# Patient Record
Sex: MALE | Race: BLACK OR AFRICAN AMERICAN | NOT HISPANIC OR LATINO | Employment: OTHER | URBAN - METROPOLITAN AREA
[De-identification: names, ages, dates, MRNs, and addresses within clinical notes are randomized per-mention and may not be internally consistent; named-entity substitution may affect disease eponyms.]

---

## 2023-03-14 ENCOUNTER — APPOINTMENT (OUTPATIENT)
Dept: PRIMARY CARE | Facility: CLINIC | Age: 53
End: 2023-03-14
Payer: COMMERCIAL

## 2023-03-20 ENCOUNTER — OFFICE VISIT (OUTPATIENT)
Dept: PRIMARY CARE | Facility: CLINIC | Age: 53
End: 2023-03-20
Payer: COMMERCIAL

## 2023-03-20 ENCOUNTER — LAB (OUTPATIENT)
Dept: LAB | Facility: LAB | Age: 53
End: 2023-03-20
Payer: COMMERCIAL

## 2023-03-20 VITALS
HEIGHT: 70 IN | DIASTOLIC BLOOD PRESSURE: 88 MMHG | BODY MASS INDEX: 23.62 KG/M2 | HEART RATE: 79 BPM | TEMPERATURE: 97.8 F | WEIGHT: 165 LBS | SYSTOLIC BLOOD PRESSURE: 144 MMHG | OXYGEN SATURATION: 99 %

## 2023-03-20 DIAGNOSIS — I10 PRIMARY HYPERTENSION: ICD-10-CM

## 2023-03-20 DIAGNOSIS — E11.42 TYPE 2 DIABETES MELLITUS WITH DIABETIC POLYNEUROPATHY, WITHOUT LONG-TERM CURRENT USE OF INSULIN (MULTI): ICD-10-CM

## 2023-03-20 DIAGNOSIS — R10.12 LUQ ABDOMINAL PAIN: Primary | ICD-10-CM

## 2023-03-20 DIAGNOSIS — M79.661 RIGHT CALF PAIN: ICD-10-CM

## 2023-03-20 DIAGNOSIS — R35.0 URINARY FREQUENCY: ICD-10-CM

## 2023-03-20 DIAGNOSIS — F40.240 CLAUSTROPHOBIA: ICD-10-CM

## 2023-03-20 DIAGNOSIS — K21.9 GASTROESOPHAGEAL REFLUX DISEASE WITHOUT ESOPHAGITIS: ICD-10-CM

## 2023-03-20 DIAGNOSIS — I25.10 CORONARY ARTERY DISEASE INVOLVING NATIVE CORONARY ARTERY OF NATIVE HEART WITHOUT ANGINA PECTORIS: ICD-10-CM

## 2023-03-20 DIAGNOSIS — R10.12 LUQ ABDOMINAL PAIN: ICD-10-CM

## 2023-03-20 DIAGNOSIS — Z12.11 COLON CANCER SCREENING: ICD-10-CM

## 2023-03-20 PROBLEM — K86.0 ALCOHOL-INDUCED CHRONIC PANCREATITIS (MULTI): Status: ACTIVE | Noted: 2023-03-20

## 2023-03-20 LAB
ALANINE AMINOTRANSFERASE (SGPT) (U/L) IN SER/PLAS: 64 U/L (ref 10–52)
ALBUMIN (G/DL) IN SER/PLAS: 4.4 G/DL (ref 3.4–5)
ALKALINE PHOSPHATASE (U/L) IN SER/PLAS: 133 U/L (ref 33–120)
ANION GAP IN SER/PLAS: 12 MMOL/L (ref 10–20)
ASPARTATE AMINOTRANSFERASE (SGOT) (U/L) IN SER/PLAS: 54 U/L (ref 9–39)
BILIRUBIN TOTAL (MG/DL) IN SER/PLAS: 0.5 MG/DL (ref 0–1.2)
CALCIUM (MG/DL) IN SER/PLAS: 9.5 MG/DL (ref 8.6–10.6)
CARBON DIOXIDE, TOTAL (MMOL/L) IN SER/PLAS: 26 MMOL/L (ref 21–32)
CHLORIDE (MMOL/L) IN SER/PLAS: 99 MMOL/L (ref 98–107)
CHOLESTEROL (MG/DL) IN SER/PLAS: 136 MG/DL (ref 0–199)
CHOLESTEROL IN HDL (MG/DL) IN SER/PLAS: 43.1 MG/DL
CHOLESTEROL/HDL RATIO: 3.2
CREATININE (MG/DL) IN SER/PLAS: 1 MG/DL (ref 0.5–1.3)
ESTIMATED AVERAGE GLUCOSE FOR HBA1C: 258 MG/DL
GFR MALE: 90 ML/MIN/1.73M2
GLUCOSE (MG/DL) IN SER/PLAS: 244 MG/DL (ref 74–99)
HEMOGLOBIN A1C/HEMOGLOBIN TOTAL IN BLOOD: 10.6 %
HEPATITIS C VIRUS AB PRESENCE IN SERUM: NONREACTIVE
LDL: 26 MG/DL (ref 0–99)
LIPASE (U/L) IN SER/PLAS: 11 U/L (ref 9–82)
NON HDL CHOLESTEROL: 93 MG/DL
POTASSIUM (MMOL/L) IN SER/PLAS: 4.4 MMOL/L (ref 3.5–5.3)
PROTEIN TOTAL: 7.7 G/DL (ref 6.4–8.2)
SODIUM (MMOL/L) IN SER/PLAS: 133 MMOL/L (ref 136–145)
TRIGLYCERIDE (MG/DL) IN SER/PLAS: 333 MG/DL (ref 0–149)
UREA NITROGEN (MG/DL) IN SER/PLAS: 9 MG/DL (ref 6–23)
VLDL: 67 MG/DL (ref 0–40)

## 2023-03-20 PROCEDURE — 86803 HEPATITIS C AB TEST: CPT

## 2023-03-20 PROCEDURE — 87389 HIV-1 AG W/HIV-1&-2 AB AG IA: CPT

## 2023-03-20 PROCEDURE — 3077F SYST BP >= 140 MM HG: CPT | Performed by: STUDENT IN AN ORGANIZED HEALTH CARE EDUCATION/TRAINING PROGRAM

## 2023-03-20 PROCEDURE — 99215 OFFICE O/P EST HI 40 MIN: CPT | Performed by: STUDENT IN AN ORGANIZED HEALTH CARE EDUCATION/TRAINING PROGRAM

## 2023-03-20 PROCEDURE — 83690 ASSAY OF LIPASE: CPT

## 2023-03-20 PROCEDURE — 80061 LIPID PANEL: CPT

## 2023-03-20 PROCEDURE — 3046F HEMOGLOBIN A1C LEVEL >9.0%: CPT | Performed by: STUDENT IN AN ORGANIZED HEALTH CARE EDUCATION/TRAINING PROGRAM

## 2023-03-20 PROCEDURE — 3079F DIAST BP 80-89 MM HG: CPT | Performed by: STUDENT IN AN ORGANIZED HEALTH CARE EDUCATION/TRAINING PROGRAM

## 2023-03-20 PROCEDURE — 36415 COLL VENOUS BLD VENIPUNCTURE: CPT

## 2023-03-20 PROCEDURE — 80053 COMPREHEN METABOLIC PANEL: CPT

## 2023-03-20 PROCEDURE — 83036 HEMOGLOBIN GLYCOSYLATED A1C: CPT

## 2023-03-20 RX ORDER — ACETAMINOPHEN 500 MG/1
1-2 CAPSULE, LIQUID FILLED ORAL EVERY 6 HOURS PRN
Qty: 180 CAPSULE | Refills: 3 | Status: SHIPPED | OUTPATIENT
Start: 2023-03-20 | End: 2023-04-19

## 2023-03-20 RX ORDER — HYDROCHLOROTHIAZIDE 25 MG/1
1 TABLET ORAL DAILY
COMMUNITY
Start: 2022-03-21 | End: 2023-03-20 | Stop reason: ALTCHOICE

## 2023-03-20 RX ORDER — IBUPROFEN 600 MG/1
600 TABLET ORAL 3 TIMES DAILY PRN
Qty: 270 TABLET | Refills: 3 | Status: SHIPPED | OUTPATIENT
Start: 2023-03-20 | End: 2024-03-19

## 2023-03-20 RX ORDER — GABAPENTIN 300 MG/1
300 CAPSULE ORAL 3 TIMES DAILY
Qty: 270 CAPSULE | Refills: 3 | Status: SHIPPED | OUTPATIENT
Start: 2023-03-20 | End: 2023-07-11 | Stop reason: SDUPTHER

## 2023-03-20 RX ORDER — CLONIDINE HYDROCHLORIDE 0.1 MG/1
TABLET ORAL
COMMUNITY
Start: 2022-09-20 | End: 2023-03-20 | Stop reason: ALTCHOICE

## 2023-03-20 RX ORDER — INSULIN PUMP SYRINGE, 3 ML
EACH MISCELLANEOUS
Qty: 1 EACH | Refills: 0 | Status: SHIPPED | OUTPATIENT
Start: 2023-03-20 | End: 2023-07-11 | Stop reason: ALTCHOICE

## 2023-03-20 RX ORDER — ATORVASTATIN CALCIUM 40 MG/1
40 TABLET, FILM COATED ORAL DAILY
Qty: 90 TABLET | Refills: 3 | Status: SHIPPED | OUTPATIENT
Start: 2023-03-20

## 2023-03-20 RX ORDER — PEN NEEDLE, DIABETIC 30 GX3/16"
NEEDLE, DISPOSABLE MISCELLANEOUS
Qty: 100 EACH | Refills: 11 | Status: SHIPPED | OUTPATIENT
Start: 2023-03-20 | End: 2023-07-11 | Stop reason: ALTCHOICE

## 2023-03-20 RX ORDER — LORAZEPAM 1 MG/1
1 TABLET ORAL ONCE
Qty: 2 TABLET | Refills: 0 | Status: SHIPPED | OUTPATIENT
Start: 2023-03-20 | End: 2023-03-22 | Stop reason: ALTCHOICE

## 2023-03-20 RX ORDER — BLOOD SUGAR DIAGNOSTIC
1 STRIP MISCELLANEOUS
Qty: 100 STRIP | Refills: 2 | Status: SHIPPED | OUTPATIENT
Start: 2023-03-20 | End: 2023-07-11 | Stop reason: ALTCHOICE

## 2023-03-20 RX ORDER — INSULIN GLARGINE 100 [IU]/ML
10 INJECTION, SOLUTION SUBCUTANEOUS NIGHTLY
Qty: 3 ML | Refills: 11 | Status: SHIPPED | OUTPATIENT
Start: 2023-03-20 | End: 2024-03-19

## 2023-03-20 RX ORDER — OMEPRAZOLE 20 MG/1
20 CAPSULE, DELAYED RELEASE ORAL
Qty: 90 CAPSULE | Refills: 3 | Status: SHIPPED | OUTPATIENT
Start: 2023-03-20

## 2023-03-20 RX ORDER — OMEPRAZOLE 20 MG/1
CAPSULE, DELAYED RELEASE ORAL
COMMUNITY
Start: 2022-09-20 | End: 2023-03-20 | Stop reason: SDUPTHER

## 2023-03-20 RX ORDER — LANCETS 28 GAUGE
EACH MISCELLANEOUS
Qty: 100 EACH | Refills: 12 | Status: SHIPPED | OUTPATIENT
Start: 2023-03-20 | End: 2023-07-11 | Stop reason: ALTCHOICE

## 2023-03-20 RX ORDER — ATORVASTATIN CALCIUM 40 MG/1
TABLET, FILM COATED ORAL
COMMUNITY
Start: 2022-11-15 | End: 2023-03-20 | Stop reason: SDUPTHER

## 2023-03-20 RX ORDER — ONDANSETRON 8 MG/1
TABLET, ORALLY DISINTEGRATING ORAL
COMMUNITY
Start: 2022-09-20 | End: 2023-03-20 | Stop reason: ALTCHOICE

## 2023-03-20 RX ORDER — INSULIN PUMP SYRINGE, 3 ML
EACH MISCELLANEOUS
Qty: 1 EACH | Refills: 0 | Status: SHIPPED | OUTPATIENT
Start: 2023-03-20

## 2023-03-20 ASSESSMENT — PAIN SCALES - GENERAL: PAINLEVEL: 6

## 2023-03-20 NOTE — PATIENT INSTRUCTIONS
Please  your medications from the pharmacy. There should be a list of medications to take in this paperwork. Please restart taking insulin (Lantus 10 units every night). Stop taking the metformin and start taking a medicine called Jardiance once a day.  Continue taking atorvastatin, aspirin, amlodipine every day.    Please make an appointment with cardiology.    Please follow up with an MRI for the abdomen to evaluate your pancreas. We sent a prescription for lorazepam 1 mg to take right before the MRI.    Please go to the lab to get blood work done and to give a urine sample.
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
## 2023-03-22 PROBLEM — M75.81 ROTATOR CUFF TENDONITIS, RIGHT: Status: ACTIVE | Noted: 2023-03-22

## 2023-03-22 PROBLEM — M75.82 ROTATOR CUFF TENDONITIS, LEFT: Status: ACTIVE | Noted: 2023-03-22

## 2023-03-22 PROBLEM — M79.661 RIGHT CALF PAIN: Status: ACTIVE | Noted: 2023-03-22

## 2023-03-22 PROBLEM — R35.0 URINARY FREQUENCY: Status: ACTIVE | Noted: 2023-03-22

## 2023-03-22 LAB — HIV 1/ 2 AG/AB SCREEN: NONREACTIVE

## 2023-03-22 RX ORDER — AMLODIPINE BESYLATE 10 MG/1
10 TABLET ORAL DAILY
COMMUNITY
Start: 2021-06-21 | End: 2023-07-11 | Stop reason: SDUPTHER

## 2023-03-22 ASSESSMENT — ENCOUNTER SYMPTOMS
CONSTIPATION: 0
COUGH: 0
POLYPHAGIA: 0
SHORTNESS OF BREATH: 0
VOMITING: 0
DIARRHEA: 0
HEMATURIA: 0
UNEXPECTED WEIGHT CHANGE: 0
CHILLS: 0
FEVER: 0
POLYDIPSIA: 0
NAUSEA: 0
ARTHRALGIAS: 1
DYSURIA: 0
ABDOMINAL PAIN: 1

## 2023-03-23 NOTE — ASSESSMENT & PLAN NOTE
-reports being on amlodipine 10 mg daily (started during ER visit)  -BP moderately well controlled today but above goal of <130/90  -given T2DM, HTN will likely benefit from ACE/ARB rather than CCB but will defer adjustments pending lab results  -check CMP

## 2023-03-23 NOTE — ASSESSMENT & PLAN NOTE
-A1c from 7/30/21 was 5.2% (noted in HIE); patient reports he was taken off all diabetes medications at that time  -last A1c on record (11/2022) is 12.8%  -reports being on only metformin 500 mg BID currently (restarted during ER visit)  -has previously been on insulin pens and expresses understanding how to use pens and how to check sugars  -represcribed diabetes testing supplies  -start Lantus 10 units at bedtime  -start Jardiance 10 mg daily  -stop metformin d/t reported GI intolerance  -gabapentin 300 mg TID for diabetic neuropathy  -recheck A1c; check urine microalbumin  -ophthalmology referral

## 2023-03-23 NOTE — ASSESSMENT & PLAN NOTE
-generally unrevealing workup in the ER w/ the exception of pancreatic abnormality noted on CT A/P; no significant spleen or hepatobiliary abnormalities  -[facility] available in HIE reveals cystic abnormality of the pancreas has been present dating back to at least 2016 w/ recommendations for close monitoring to ensure stability; last MRI appears to be 2017  -recheck CMP and check lipase, HIV, HCV  -MRCP for further evaluation of pancreas abnormality  -Tylenol/ibuprofen PRN for pain

## 2023-03-23 NOTE — ASSESSMENT & PLAN NOTE
-per November 2022 note while admitted for ACS r/o: Cardiac cath 02/2018 showing 50% mid vessel LAD stenosis, 30% proximal 1st obtuse marginal stenosis, and 30% midvessel RCA stenosis  -per 11/22 admission apparently had x3 stent placement at [facility] in [place], [place] in 6/2021 (contrary to report this visit of stent x3 in 2020)  -echo 11/12/22 showed LVEF 65-70% w/ diastolic dysfunction  -NM stress 11/14/22 w/o evidence of ischemic  -unclear how long he has not been taking any medications for CAD  -restart aspirin 81 mg  -restart atorvastatin 40 mg daily  -lipid panel  -cardiology referral

## 2023-03-23 NOTE — ASSESSMENT & PLAN NOTE
-history of severe claustrophobia w/ prior MRIs  -will prescribe lorazepam 1 mg tab (2 tabs) to take PRN with MRCP (order inadvertently canceled however pt already filled rx)

## 2023-03-27 NOTE — PROGRESS NOTES
I saw and evaluated the patient. I personally obtained the key and critical portions of the history and physical exam or was physically present for key and critical portions performed by the resident/fellow. I reviewed the resident/fellow's documentation and discussed the patient with the resident/fellow. I agree with the resident/fellow's medical decision making as documented in the note.    Benjamín Varghese MD

## 2023-07-11 ENCOUNTER — OFFICE VISIT (OUTPATIENT)
Dept: PRIMARY CARE | Facility: CLINIC | Age: 53
End: 2023-07-11
Payer: COMMERCIAL

## 2023-07-11 DIAGNOSIS — E11.42 TYPE 2 DIABETES MELLITUS WITH DIABETIC POLYNEUROPATHY, WITHOUT LONG-TERM CURRENT USE OF INSULIN (MULTI): Primary | ICD-10-CM

## 2023-07-11 DIAGNOSIS — F17.200 TOBACCO USE DISORDER: ICD-10-CM

## 2023-07-11 DIAGNOSIS — I10 PRIMARY HYPERTENSION: ICD-10-CM

## 2023-07-11 DIAGNOSIS — K86.9 PANCREATIC LESION (HHS-HCC): ICD-10-CM

## 2023-07-11 DIAGNOSIS — I25.10 CORONARY ARTERY DISEASE INVOLVING NATIVE CORONARY ARTERY OF NATIVE HEART WITHOUT ANGINA PECTORIS: ICD-10-CM

## 2023-07-11 LAB — POC FINGERSTICK BLOOD GLUCOSE: 409 MG/DL (ref 70–100)

## 2023-07-11 PROCEDURE — 3046F HEMOGLOBIN A1C LEVEL >9.0%: CPT | Performed by: STUDENT IN AN ORGANIZED HEALTH CARE EDUCATION/TRAINING PROGRAM

## 2023-07-11 PROCEDURE — 4010F ACE/ARB THERAPY RXD/TAKEN: CPT | Performed by: STUDENT IN AN ORGANIZED HEALTH CARE EDUCATION/TRAINING PROGRAM

## 2023-07-11 PROCEDURE — 3077F SYST BP >= 140 MM HG: CPT | Performed by: STUDENT IN AN ORGANIZED HEALTH CARE EDUCATION/TRAINING PROGRAM

## 2023-07-11 PROCEDURE — 3080F DIAST BP >= 90 MM HG: CPT | Performed by: STUDENT IN AN ORGANIZED HEALTH CARE EDUCATION/TRAINING PROGRAM

## 2023-07-11 PROCEDURE — 82962 GLUCOSE BLOOD TEST: CPT | Performed by: STUDENT IN AN ORGANIZED HEALTH CARE EDUCATION/TRAINING PROGRAM

## 2023-07-11 PROCEDURE — 99213 OFFICE O/P EST LOW 20 MIN: CPT | Performed by: STUDENT IN AN ORGANIZED HEALTH CARE EDUCATION/TRAINING PROGRAM

## 2023-07-11 RX ORDER — BLOOD SUGAR DIAGNOSTIC
1 STRIP MISCELLANEOUS
Qty: 100 STRIP | Refills: 3 | Status: SHIPPED | OUTPATIENT
Start: 2023-07-11

## 2023-07-11 RX ORDER — GABAPENTIN 300 MG/1
300 CAPSULE ORAL 3 TIMES DAILY
Qty: 270 CAPSULE | Refills: 3 | Status: SHIPPED | OUTPATIENT
Start: 2023-07-11 | End: 2024-07-10

## 2023-07-11 RX ORDER — AMLODIPINE BESYLATE 10 MG/1
10 TABLET ORAL DAILY
Qty: 90 TABLET | Refills: 1 | Status: SHIPPED | OUTPATIENT
Start: 2023-07-11

## 2023-07-11 RX ORDER — LANCETS
EACH MISCELLANEOUS
COMMUNITY
Start: 2023-03-23

## 2023-07-11 RX ORDER — LANCETS 30 GAUGE
EACH MISCELLANEOUS
COMMUNITY
Start: 2023-03-23

## 2023-07-11 RX ORDER — UBIQUINOL 100 MG
CAPSULE ORAL
COMMUNITY
Start: 2023-03-20

## 2023-07-11 RX ORDER — LOSARTAN POTASSIUM 50 MG/1
50 TABLET ORAL DAILY
COMMUNITY
Start: 2023-04-13

## 2023-07-11 RX ORDER — PEN NEEDLE, DIABETIC 31 GX5/16"
NEEDLE, DISPOSABLE MISCELLANEOUS
COMMUNITY
Start: 2023-03-20

## 2023-07-11 NOTE — PATIENT INSTRUCTIONS
Dear Mr. Dhillon,     It was a pleasure getting to manage your care with you today.     Labs:  We ordered Labs for you at the  Labs.     Directions to  Labs:   1. You can find them as you exit our clinic walk past the coffee shop.   2. Turn Right and walk to the end of the vaca (you will see a staircase).   3. When you arrive at the end of the vaca, turn left and walk down the hallway with skylights.   4. Half way down the blade light hallway you will see the lab on your left    Prescriptions:  We have sent medication prescriptions to your pharmacy on file. Please pick them up at your earliest convenience.     Referral:   We have provided you the below referrals. Please call to schedule referrals if no one has contacted you within 3 days.   1. Cardiac MRI  2. MRI of pancreas  3. Colonoscopy (please call to get this appointment scheduled)  4. Ophthalmology - please get an eye doctor appointment for follow up    Follow up Appointment: 1 month for follow up visit. Please bring your glucometer with you to your next visit so that we can review it together. Please check your blood sugar every day with meals.    Emergency  In the case of an emergency please call 911 or visit the Emergency Department immediately for evaluation.     We look forward to continuing your care here at our Clinic. Take Care.     Sincerely,   Keven Babb MD

## 2023-07-11 NOTE — PROGRESS NOTES
SUBJECTIVE  Chief Complaint (CC): cardiac MRI referral     HPI:   Augustine Dhillon is a 54 yo male w/PMHx of HTN, Diabetes presenting for MRI referral and medication refill. Patient has not taken any medications today (usually takes them in the morning)    #Medication refill  Gabapentin, Jardiance, amlodipine, test strips    #Exertional chest pain        -    Established care with cardiology here in April and was reporting exertional chest pain  Cardiologist wanted to get a stress MRI of the heart d/t hx of CAD for further evaluation but insurance wouldn't cover it   Now has new insurance which will reportedly cover the MRI so he is requesting a new order    #Diabetes  Fasting glucose of ~78 per patient   Last HbA1c 03/20/23: 10.6 (5 months prior 12/8/22)  Medications: Lantus 10 units QHS, Jardiance 25 mg daily  Gabapentin 300 mg TID for diabetic neuropathy   Still needs to make an ophthalmologist visit   No dizziness, syncope, tremor, fatigue    #HTN  Medication: amlodipine 10 mg   Home BP measurements: 130/70   No headaches, no GI/ concerns, no SOB, no chest pain, no heart palpitations since June ED visit    #LUQ pain  Significantly improved since last visit  Has hx of pancreatic lesion, needs MRCP for monitoring but was unable to get after last visit d/t insurance  Only takes Tylenol/ibuprofen PRN for pain    SH:   Living: Lives with fiance  Occupation: on [employer]   Diet: Cutting down fried foods, eating less salt    Exercise: No regular forms of exercise  PHQ2: Negative  Tobacco: ½ pack a day (30 years)   Alcohol: 2-3 drinks on the weekend   Illicit drugs: Denies   Sexual Hx: Yes, female, no contraceptives      ROS:  ROS as above in HPI    OBJECTIVE  Vitals:    07/11/23 1321 07/11/23 1400   BP: (!) 66/41 (!) 178/96   BP Location: Right arm    Patient Position: Sitting    BP Cuff Size: Adult    Pulse: 61    Temp: 37.1 °C (98.7 °F)    TempSrc: Temporal    SpO2: 98%    Weight: 74.3 kg (163 lb 11.2 oz)        Physical:   Gen: Awake, alert, conversant, appears stated age, no acute distress   HEENT: No lymphadenopathy, no thyromegaly   Cardio/Vasc: RRR, normal S1,S2, no M/R/G   Pulmn: CTAB, normal respiratory effort on room air   MSK: No focal joint swelling noted, strength intact 5/5 in all extremities bilaterally   Neuro: Able to follow commands, no gross focal neuro deficits   Psych: Coherent thought process, appropriate mood and affect    Labs:  07/11/2023 Finger stick glucose during visit: 409    ASSESSMENT/PLAN:  Augustine Dhillon is a 54 yo male w/PMHx of HTN, Diabetes presenting for MRI referral and medication refill. The patient left after exam and prior to discussion of plan. He was later contacted over the phone with brief review of plan/recommendations.    Problem List Items Addressed This Visit       Coronary artery disease involving native coronary artery of native heart without angina pectoris     -saw cardiology in April and was noting some exertional chest pain at that visit  -no current chest pain  -re-ordered MRI cardiac w/ stress for further evaluation         Relevant Medications    amLODIPine (Norvasc) 10 mg tablet    Other Relevant Orders    MR cardiac w and wo IV contrast w regadenoson stress for MORPH FUNCT and valve DZ    Type 2 diabetes mellitus with diabetic polyneuropathy, without long-term current use of insulin (CMS/[facility], [facility]) - Primary     -reports fasting glucose in the 70s at home suggesting good control however POCT glucose in office is 400  -continue Lantus 10 units at bedtime  -continue Jardiance 25 mg daily  -will recheck A1c and urine albumin  -bring glucometer to next visit to review  -if fasting glucose is actually 70s then will likely need mealtime short acting insulin for improved control  -f/up w/ ophthalmology as previously referred         Relevant Medications    OneTouch Ultra Test strip    empagliflozin (Jardiance) 25 mg    gabapentin (Neurontin) 300 mg capsule    Other Relevant  Orders    POCT Fingerstick Glucose manually resulted (Completed)    Hemoglobin A1C    Albumin , Urine Random    Creatinine, Serum    Primary hypertension     -initially documented as severely hypotensive w/ repeat being significantly hypertensive  -unable to recheck BP in both arms as patient left prior to completion of visit  -denies having taking medications yet today  -continue amlodipine 10 mg daily  -check RFP for further evaluation  -bring home BP log to next visit  -will plan to initiate ARB (potentially already on losartan 50 mg daily per EMR review although not per patient report) at next visit         Relevant Medications    amLODIPine (Norvasc) 10 mg tablet    Other Relevant Orders    Creatinine, Serum    Pancreatic lesion     -reordered MRCP         Relevant Orders    MR abdomen w and wo IV contrast MRCP    Tobacco use disorder     -provided smoking cessation resources  -tried patches a long time ago and just stopped using them; would be interested in trying again  -wife also smokes, discussed trying to stop at the same time  -not ready to set a quit date yet, will send NRT rx when ready          #Routine health management  Colonsocopy was ordered last visit; order remains active, just needs to schedule appointment    Follow up in 1 month for diabetes and blood pressure reassessment.    Patient discussed with Dr. White.    Nicholas King, MS3    Patient seen and evaluated with medical student. Agree with assessment above, edits made within text.    Keven Babb MD  FM PGY-3  [facility]

## 2023-07-13 VITALS
OXYGEN SATURATION: 98 % | TEMPERATURE: 98.7 F | DIASTOLIC BLOOD PRESSURE: 96 MMHG | WEIGHT: 163.7 LBS | BODY MASS INDEX: 24.17 KG/M2 | SYSTOLIC BLOOD PRESSURE: 178 MMHG | HEART RATE: 61 BPM

## 2023-07-13 PROBLEM — R05.3 COUGH, PERSISTENT: Status: RESOLVED | Noted: 2019-07-27 | Resolved: 2023-07-13

## 2023-07-13 PROBLEM — M15.9 PRIMARY OSTEOARTHRITIS INVOLVING MULTIPLE JOINTS: Status: ACTIVE | Noted: 2023-07-13

## 2023-07-13 PROBLEM — F19.11 HISTORY OF DRUG ABUSE (MULTI): Status: ACTIVE | Noted: 2023-07-13

## 2023-07-13 PROBLEM — F17.200 TOBACCO USE DISORDER: Status: ACTIVE | Noted: 2023-07-13

## 2023-07-13 PROBLEM — R07.9 CHEST PAIN: Status: RESOLVED | Noted: 2020-01-10 | Resolved: 2023-07-13

## 2023-07-13 PROBLEM — F10.11 HISTORY OF ALCOHOL ABUSE: Status: RESOLVED | Noted: 2023-07-13 | Resolved: 2023-07-13

## 2023-07-13 PROBLEM — D64.9 NORMOCYTIC ANEMIA: Status: ACTIVE | Noted: 2017-04-23

## 2023-07-13 PROBLEM — K76.0 FATTY LIVER: Status: ACTIVE | Noted: 2017-04-19

## 2023-07-13 PROBLEM — G62.9 NEUROPATHY: Status: ACTIVE | Noted: 2021-07-30

## 2023-07-13 PROBLEM — N52.9 ED (ERECTILE DYSFUNCTION): Status: ACTIVE | Noted: 2023-07-13

## 2023-07-13 PROBLEM — J44.9 SUSPECTED CHRONIC OBSTRUCTIVE PULMONARY DISEASE BASED ON INITIAL EVALUATION (MULTI): Status: ACTIVE | Noted: 2021-10-29

## 2023-07-13 PROBLEM — R19.5 RED STOOL: Status: RESOLVED | Noted: 2021-11-19 | Resolved: 2023-07-13

## 2023-07-13 PROBLEM — I51.7 MILD CONCENTRIC LEFT VENTRICULAR HYPERTROPHY (LVH): Status: ACTIVE | Noted: 2018-03-31

## 2023-07-13 PROBLEM — F10.10 ALCOHOL ABUSE: Status: RESOLVED | Noted: 2023-07-13 | Resolved: 2023-07-13

## 2023-07-13 PROBLEM — R05.3 COUGH, PERSISTENT: Status: ACTIVE | Noted: 2019-07-27

## 2023-07-13 PROBLEM — R80.9 MICROALBUMINURIA: Status: ACTIVE | Noted: 2017-04-12

## 2023-07-13 PROBLEM — E55.9 VITAMIN D DEFICIENCY: Status: ACTIVE | Noted: 2017-08-18

## 2023-07-13 PROBLEM — R19.5 RED STOOL: Status: ACTIVE | Noted: 2021-11-19

## 2023-07-13 PROBLEM — E78.5 HYPERLIPIDEMIA: Status: ACTIVE | Noted: 2023-07-13

## 2023-07-13 PROBLEM — R07.9 CHEST PAIN: Status: ACTIVE | Noted: 2020-01-10

## 2023-07-13 PROBLEM — F10.11 HISTORY OF ALCOHOL ABUSE: Status: ACTIVE | Noted: 2023-07-13

## 2023-07-13 PROBLEM — F19.11 HISTORY OF DRUG ABUSE (MULTI): Status: RESOLVED | Noted: 2023-07-13 | Resolved: 2023-07-13

## 2023-07-13 PROBLEM — F10.10 ALCOHOL ABUSE: Status: ACTIVE | Noted: 2023-07-13

## 2023-07-13 NOTE — ASSESSMENT & PLAN NOTE
-saw cardiology in April and was noting some exertional chest pain at that visit  -no current chest pain  -re-ordered MRI cardiac w/ stress for further evaluation

## 2023-07-13 NOTE — ASSESSMENT & PLAN NOTE
-reports fasting glucose in the 70s at home suggesting good control however POCT glucose in office is 400  -continue Lantus 10 units at bedtime  -continue Jardiance 25 mg daily  -will recheck A1c and urine albumin  -bring glucometer to next visit to review  -if fasting glucose is actually 70s then will likely need mealtime short acting insulin for improved control  -f/up w/ ophthalmology as previously referred

## 2023-07-13 NOTE — ASSESSMENT & PLAN NOTE
-provided smoking cessation resources  -tried patches a long time ago and just stopped using them; would be interested in trying again  -wife also smokes, discussed trying to stop at the same time  -not ready to set a quit date yet, will send NRT rx when ready

## 2023-07-13 NOTE — ASSESSMENT & PLAN NOTE
-initially documented as severely hypotensive w/ repeat being significantly hypertensive  -unable to recheck BP in both arms as patient left prior to completion of visit  -denies having taking medications yet today  -continue amlodipine 10 mg daily  -check RFP for further evaluation  -bring home BP log to next visit  -will plan to initiate ARB (potentially already on losartan 50 mg daily per EMR review although not per patient report) at next visit

## 2023-07-20 ENCOUNTER — TELEPHONE (OUTPATIENT)
Dept: PRIMARY CARE | Facility: CLINIC | Age: 53
End: 2023-07-20
Payer: COMMERCIAL

## 2023-07-21 DIAGNOSIS — F40.240 CLAUSTROPHOBIA: Primary | ICD-10-CM

## 2023-07-21 RX ORDER — LORAZEPAM 1 MG/1
1 TABLET ORAL SEE ADMIN INSTRUCTIONS
Qty: 2 TABLET | Refills: 0 | Status: SHIPPED | OUTPATIENT
Start: 2023-07-21 | End: 2023-08-20

## 2024-10-28 ENCOUNTER — APPOINTMENT (OUTPATIENT)
Dept: SURGICAL ONCOLOGY | Facility: CLINIC | Age: 54
End: 2024-10-28
Payer: COMMERCIAL

## 2024-10-28 VITALS
BODY MASS INDEX: 24.57 KG/M2 | HEART RATE: 63 BPM | TEMPERATURE: 98.6 F | WEIGHT: 165.9 LBS | DIASTOLIC BLOOD PRESSURE: 77 MMHG | SYSTOLIC BLOOD PRESSURE: 127 MMHG | RESPIRATION RATE: 18 BRPM | HEIGHT: 69 IN

## 2024-10-28 DIAGNOSIS — F10.20 CHRONIC PANCREATITIS DUE TO CHRONIC ALCOHOLISM (MULTI): Primary | ICD-10-CM

## 2024-10-28 DIAGNOSIS — K86.0 CHRONIC PANCREATITIS DUE TO CHRONIC ALCOHOLISM (MULTI): Primary | ICD-10-CM

## 2024-10-28 PROCEDURE — 3008F BODY MASS INDEX DOCD: CPT | Performed by: SURGERY

## 2024-10-28 PROCEDURE — 3074F SYST BP LT 130 MM HG: CPT | Performed by: SURGERY

## 2024-10-28 PROCEDURE — 4010F ACE/ARB THERAPY RXD/TAKEN: CPT | Performed by: SURGERY

## 2024-10-28 PROCEDURE — 3078F DIAST BP <80 MM HG: CPT | Performed by: SURGERY

## 2024-10-28 PROCEDURE — 99203 OFFICE O/P NEW LOW 30 MIN: CPT | Performed by: SURGERY
